# Patient Record
Sex: FEMALE | Race: WHITE | NOT HISPANIC OR LATINO | URBAN - METROPOLITAN AREA
[De-identification: names, ages, dates, MRNs, and addresses within clinical notes are randomized per-mention and may not be internally consistent; named-entity substitution may affect disease eponyms.]

---

## 2023-01-26 ENCOUNTER — TELEMEDICINE (OUTPATIENT)
Dept: BEHAVIORAL/MENTAL HEALTH CLINIC | Facility: CLINIC | Age: 58
End: 2023-01-26

## 2023-01-26 DIAGNOSIS — F33.0 MAJOR DEPRESSIVE DISORDER, RECURRENT, MILD (HCC): ICD-10-CM

## 2023-01-26 DIAGNOSIS — F43.10 POST TRAUMATIC STRESS DISORDER (PTSD): ICD-10-CM

## 2023-01-26 DIAGNOSIS — F41.1 GENERALIZED ANXIETY DISORDER: Primary | ICD-10-CM
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
## 2023-02-09 ENCOUNTER — TELEMEDICINE (OUTPATIENT)
Dept: BEHAVIORAL/MENTAL HEALTH CLINIC | Facility: CLINIC | Age: 58
End: 2023-02-09

## 2023-02-09 DIAGNOSIS — F41.1 GENERALIZED ANXIETY DISORDER: Primary | ICD-10-CM

## 2023-02-09 DIAGNOSIS — F43.10 POST TRAUMATIC STRESS DISORDER (PTSD): ICD-10-CM

## 2023-02-09 DIAGNOSIS — F33.2 MAJOR DEPRESSIVE DISORDER, RECURRENT SEVERE WITHOUT PSYCHOTIC FEATURES (HCC): ICD-10-CM
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
## 2023-02-23 ENCOUNTER — TELEMEDICINE (OUTPATIENT)
Dept: BEHAVIORAL/MENTAL HEALTH CLINIC | Facility: CLINIC | Age: 58
End: 2023-02-23

## 2023-02-23 DIAGNOSIS — F43.10 PTSD (POST-TRAUMATIC STRESS DISORDER): ICD-10-CM

## 2023-02-23 DIAGNOSIS — F33.1 MAJOR DEPRESSIVE DISORDER, RECURRENT, MODERATE (HCC): ICD-10-CM

## 2023-02-23 DIAGNOSIS — F41.1 GENERALIZED ANXIETY DISORDER: Primary | ICD-10-CM

## 2023-02-23 NOTE — PSYCH
Virtual Regular Visit    Verification of patient location:    Patient is located in the following state in which I hold an active license Other; [place]       Assessment/Plan:    Problem List Items Addressed This Visit    None  Visit Diagnoses     Generalized anxiety disorder    -  Primary    Major depressive disorder, recurrent, moderate ([facility])        PTSD (post-traumatic stress disorder)              Goals addressed in session: Goal 1          Reason for visit is   Chief Complaint   Patient presents with   • Virtual Regular Visit        Encounter provider Arya Quintero    Provider located at [address] [place] [address]  [phone]      Recent Visits  No visits were found meeting these conditions  Showing recent visits within past 7 days and meeting all other requirements  Today's Visits  Date Type Provider Dept   02/23/23 Telemedicine [facility] Therapist [facility]   Showing today's visits and meeting all other requirements  Future Appointments  No visits were found meeting these conditions  Showing future appointments within next 150 days and meeting all other requirements       The patient was identified by name and date of birth  Emanuel Antoine was informed that this is a telemedicine visit and that the visit is being conducted [employer]  She agrees to proceed     My office door was closed  No one else was in the room  She acknowledged consent and understanding of privacy and security of the video platform  The patient has agreed to participate and understands they can discontinue the visit at any time  Patient is aware this is a billable service  Subjective  Emanuel Antoine is a 65 y o  female    HPI     No past medical history on file  No past surgical history on file  No current outpatient medications on file  No current facility-administered medications for this visit  Not on File    Review of Systems    Video Exam    There were no vitals filed for this visit  Physical Exam     Behavioral Health Psychotherapy Progress Note    Psychotherapy Provided: Individual Psychotherapy     1  Generalized anxiety disorder        2  Major depressive disorder, recurrent, moderate (HCC)        3  PTSD (post-traumatic stress disorder)            Goals addressed in session: Goal 1     DATA: Client presented for a telehealth session via [address]  Client reported that she had gotten a chest x-ray, after she had gone to get checked out after coughing up blood  Client expressed that there was a mass on her one lung that was in question  The doctors reported that there was a 90% chance that it could be cancer  She has a scan on March 3rd to get a closer look  She shared that they may be doing a biopsy and send it to pathology for review  In the meantime, client shared that she will be seeing a pulmonologist and getting blood work done  Client reported that she is trying to eat better and exercise more, but is limited to what she can eat and what she can do  Client talked about her son and how they are still waiting to see a neurologist at the end of next month  During this session, this clinician used the following therapeutic modalities: Client-centered Therapy and Cognitive Behavioral Therapy    Substance Abuse was not addressed during this session  If the client is diagnosed with a co-occurring substance use disorder, please indicate any changes in the frequency or amount of use: N/A  Stage of change for addressing substance use diagnoses: No substance use/Not applicable    ASSESSMENT:  Tommie Torres presents with a Euthymic/ normal mood  her affect is Normal range and intensity, which is congruent, with her mood and the content of the session  The client has made progress on their goals       Tommie Melissa presents with a low risk of suicide, low risk of self-harm, and low risk of harm to others  For any risk assessment that surpasses a "low" rating, a safety plan must be developed  A safety plan was indicated: no  If yes, describe in detail N/A    PLAN: Between sessions, Cheyanne Dawson will explore the possibility of connecting to a nurse navigator and other supports as her medical situation unravels  At the next session, the therapist will use Client-centered Therapy and Cognitive Behavioral Therapy to address anxieties related to her medical situation  Behavioral Health Treatment Plan and Discharge Planning: Cheyanne Dawson is aware of and agrees to continue to work on their treatment plan  They have identified and are working toward their discharge goals   yes    Visit start and stop times:    02/23/23  Start Time: 1400  Stop Time: 1455  Total Visit Time: 55 minutes

## 2023-03-02 ENCOUNTER — TELEPHONE (OUTPATIENT)
Dept: BEHAVIORAL/MENTAL HEALTH CLINIC | Facility: CLINIC | Age: 58
End: 2023-03-02

## 2023-03-02 NOTE — TELEPHONE ENCOUNTER
Client called yesterday informing clinician that she had to cancel today's appointment  She has another appointment scheduled next week

## 2023-03-09 ENCOUNTER — TELEMEDICINE (OUTPATIENT)
Dept: BEHAVIORAL/MENTAL HEALTH CLINIC | Facility: CLINIC | Age: 58
End: 2023-03-09

## 2023-03-09 DIAGNOSIS — F41.1 GENERALIZED ANXIETY DISORDER: Primary | ICD-10-CM

## 2023-03-09 DIAGNOSIS — F33.1 MAJOR DEPRESSIVE DISORDER, RECURRENT, MODERATE (HCC): ICD-10-CM

## 2023-03-09 NOTE — PSYCH
Virtual Regular Visit    Verification of patient location:    Patient is located in the following state in which I hold an active license Other; [place]      Assessment/Plan:    Problem List Items Addressed This Visit    None  Visit Diagnoses     Generalized anxiety disorder    -  Primary    Major depressive disorder, recurrent, moderate ([facility] [address] )              Goals addressed in session: Goal 1          Reason for visit is   Chief Complaint   Patient presents with   • Virtual Regular Visit        Encounter provider Lukas Haro    Provider located at [address] [place] [address]  [phone]      Recent Visits  Date Type Provider Dept   03/02/23 Telephone [place] Therapist [facility]   Showing recent visits within past 7 days and meeting all other requirements  Today's Visits  Date Type Provider Dept   03/09/23 Telemedicine [place] Therapist [facility]   Showing today's visits and meeting all other requirements  Future Appointments  No visits were found meeting these conditions  Showing future appointments within next 150 days and meeting all other requirements       The patient was identified by name and date of birth  Juli Moser was informed that this is a telemedicine visit and that the visit is being conducted [employer]  She agrees to proceed     My office door was closed  No one else was in the room  She acknowledged consent and understanding of privacy and security of the video platform  The patient has agreed to participate and understands they can discontinue the visit at any time  Patient is aware this is a billable service  Subjective  Juli Moser is a 65 y o  female   HPI     No past medical history on file  No past surgical history on file  No current outpatient medications on file  No current facility-administered medications for this visit  Not on File    Review of Systems    Video Exam    There were no vitals filed for this visit  Physical Exam     Behavioral Health Psychotherapy Progress Note    Psychotherapy Provided: Individual Psychotherapy     1  Generalized anxiety disorder        2  Major depressive disorder, recurrent, moderate ([facility] [address] )            Goals addressed in session: Goal 1     DATA: Client presented for a telehealth session via [address]  Client reported that she has a breathing functioning test at 7am and a PET scan at noon tomorrow  Client shared that this will determine the stage of her cancer diagnosis  Since last session, client expressed that she had a CT scan of her lungs, due to the mass they discovered at Urgent care  The doctors determined that it was cancerous  Client reported that she will be seeing a thoracic surgeon to discuss treatment options and to potentially have surgery to remove the tumor  Client was also informed that during the CT scan; they had discovered a mass on her adrenal glands  Client addressed that by Tuesday, she will know her treatment options  Client spoke about her family history and how her father had  of cancer  She mentioned that she is not too surprised with the cancer diagnosis, but is afraid of what is to follow  Client reported that she has several supports that are going to get her to and from appointments  During this session, this clinician used the following therapeutic modalities: Client-centered Therapy       Substance Abuse was not addressed during this session  If the client is diagnosed with a co-occurring substance use disorder, please indicate any changes in the frequency or amount of use: N/A  Stage of change for addressing substance use diagnoses: No substance use/Not applicable    ASSESSMENT:  Melida Heredia presents with a Euthymic/ normal mood       her affect is Normal range and intensity, which is congruent, with her mood and the content of the session  The client has made progress on their goals  Felipe Izquierdo presents with a low risk of suicide, low risk of self-harm, and low risk of harm to others  For any risk assessment that surpasses a "low" rating, a safety plan must be developed  A safety plan was indicated: no  If yes, describe in detail N/A    PLAN: Between sessions, Felipe Izquierdo will utilize coping skills to manage stress/anxiety  Will meet with doctors to discuss treatment options and how to move forward  At the next session, the therapist will use Client-centered Therapy and Cognitive Behavioral Therapy to address anxiety  Behavioral Health Treatment Plan and Discharge Planning: Felipe Izquierdo is aware of and agrees to continue to work on their treatment plan  They have identified and are working toward their discharge goals   yes    Visit start and stop times:    03/09/23  Start Time: 1620  Stop Time: 1700  Total Visit Time: 40 minutes

## 2023-03-16 ENCOUNTER — TELEPHONE (OUTPATIENT)
Dept: PSYCHIATRY | Facility: CLINIC | Age: 58
End: 2023-03-16

## 2023-03-20 ENCOUNTER — TELEPHONE (OUTPATIENT)
Dept: BEHAVIORAL/MENTAL HEALTH CLINIC | Facility: CLINIC | Age: 58
End: 2023-03-20

## 2023-03-20 NOTE — TELEPHONE ENCOUNTER
Called client after client did not show for scheduled appointment  Client expressed not feeling well and apologized for not reaching out  Rescheduled appointment for next Thursday

## 2023-11-10 ENCOUNTER — DOCUMENTATION (OUTPATIENT)
Dept: BEHAVIORAL/MENTAL HEALTH CLINIC | Facility: CLINIC | Age: 58
End: 2023-11-10

## 2023-11-10 NOTE — PROGRESS NOTES
Psychotherapy Discharge Summary    Preferred Name: Guerda Loera  YOB: 1965    Admission date to psychotherapy: January 2022    Referred by: Self    Presenting Problem: Anxiety, depression, PTSD    Course of treatment included : individual therapy     Progress/Outcome of Treatment Goals (brief summary of course of treatment) Managing anxiety, depression and PTSD    Treatment Complications (if any): N/A    Treatment Progress: fair    Current [facility] Psychiatric Provider: N/A    Discharge Medications include: N/A    Discharge Date: November 10, 2023    Discharge Diagnosis: No diagnosis found.     Criteria for Discharge: demonstrated failure to uphold their treatment plan/contract    Aftercare recommendations include (include specific referral names and phone numbers, if appropriate): [facility] to [facility] need [facility]    Prognosis: fair